# Patient Record
Sex: FEMALE | Race: WHITE | NOT HISPANIC OR LATINO | Employment: UNEMPLOYED | URBAN - NONMETROPOLITAN AREA
[De-identification: names, ages, dates, MRNs, and addresses within clinical notes are randomized per-mention and may not be internally consistent; named-entity substitution may affect disease eponyms.]

---

## 2022-02-21 DIAGNOSIS — I73.9 PVD (PERIPHERAL VASCULAR DISEASE) WITH CLAUDICATION: Primary | ICD-10-CM

## 2022-03-02 ENCOUNTER — OFFICE VISIT (OUTPATIENT)
Dept: CARDIAC SURGERY | Facility: CLINIC | Age: 87
End: 2022-03-02

## 2022-03-02 VITALS
OXYGEN SATURATION: 97 % | DIASTOLIC BLOOD PRESSURE: 81 MMHG | SYSTOLIC BLOOD PRESSURE: 180 MMHG | WEIGHT: 140 LBS | BODY MASS INDEX: 23.9 KG/M2 | HEIGHT: 64 IN | HEART RATE: 82 BPM

## 2022-03-02 DIAGNOSIS — R60.0 LEG EDEMA: ICD-10-CM

## 2022-03-02 DIAGNOSIS — I87.2 VENOUS INSUFFICIENCY OF BOTH LOWER EXTREMITIES: Primary | ICD-10-CM

## 2022-03-02 DIAGNOSIS — R06.00 DYSPNEA, UNSPECIFIED TYPE: ICD-10-CM

## 2022-03-02 PROBLEM — I10 HTN (HYPERTENSION): Status: ACTIVE | Noted: 2022-03-02

## 2022-03-02 PROBLEM — L97.909 VENOUS ULCER: Status: ACTIVE | Noted: 2022-03-02

## 2022-03-02 PROBLEM — M19.90 ARTHRITIS: Status: ACTIVE | Noted: 2022-03-02

## 2022-03-02 PROBLEM — I83.009 VENOUS ULCER: Status: ACTIVE | Noted: 2022-03-02

## 2022-03-02 PROCEDURE — 99204 OFFICE O/P NEW MOD 45 MIN: CPT | Performed by: NURSE PRACTITIONER

## 2022-03-02 RX ORDER — ACETAMINOPHEN AND CODEINE PHOSPHATE 300; 30 MG/1; MG/1
1 TABLET ORAL EVERY 4 HOURS PRN
COMMUNITY

## 2022-03-02 RX ORDER — PREDNISONE 2.5 MG
TABLET ORAL
COMMUNITY
Start: 2022-02-11

## 2022-03-02 RX ORDER — HYDROXYCHLOROQUINE SULFATE 200 MG/1
TABLET, FILM COATED ORAL
COMMUNITY
Start: 2022-02-11

## 2022-03-02 RX ORDER — METHOTREXATE 2.5 MG/1
TABLET ORAL
COMMUNITY
Start: 2022-02-22

## 2022-03-02 RX ORDER — PRAMIPEXOLE DIHYDROCHLORIDE 0.5 MG/1
TABLET ORAL
COMMUNITY

## 2022-03-02 RX ORDER — HYDROCHLOROTHIAZIDE 12.5 MG/1
TABLET ORAL
COMMUNITY

## 2022-03-02 RX ORDER — AMLODIPINE BESYLATE 2.5 MG/1
TABLET ORAL
COMMUNITY

## 2022-03-02 NOTE — PATIENT INSTRUCTIONS
Normal arterial circulation on todays arterial study.     Bilateral venous ultrasound is negative for blood clot or vein reflux.  Pulsatile flow is seen on ultrasound which can sometimes be related to fluid volume overloaded states such as congestive heart failure.  We will obtain echocardiogram to review the contraction of the heart.      Will call with results.     Continue current wound care and compressive therapy.

## 2022-03-02 NOTE — PROGRESS NOTES
CVTS Office Progress Note     3/2/2022    Rosa Duke  2/18/1931    Chief Complaint:    Chief Complaint   Patient presents with   • Leg Swelling   • Chronic Venous Insufficiency w/ Ulceration       HPI:      PCP:  Jhoana Navarrete MD    89 y.o. female with HTN(stable, increased risk stroke, rupture) and Obesity(uncontrolled, increased risk cardiovascular events) leg edema x2 years, venous ulceration Right leg seeing wound care with compressive wraps/polymem.  never smoked.  Slow healing venous ulceration, arterial ultrasound in [facility] suggestive of inflow disease.  Referred to vascular surgery for arterial and venous studies.  Does complain of dyspnea with minimal exertion.  No other associated signs, symptoms or modifying factors.    3/2022 RICK: Right 1.2 triphasic.  Left 1.13 triphasic  3/2022 Bilateral venous: Pulsatile flow BLE, negative for DVT or reflux.     The following portions of the patient's history were reviewed and updated as appropriate: allergies, current medications, past family history, past medical history, past social history, past surgical history and problem list.  Recent images independently reviewed.  Available laboratory values reviewed.    PMH:  Past Medical History:   Diagnosis Date   • Arthritis    • Cancer (HCC)     skin   • Leg ulcer (HCC)     right leg     Past Surgical History:   Procedure Laterality Date   • APPENDECTOMY     • HYSTERECTOMY       Family History   Problem Relation Age of Onset   • No Known Problems Mother    • No Known Problems Father      Social History     Tobacco Use   • Smoking status: Never Smoker   • Smokeless tobacco: Never Used   Substance Use Topics   • Alcohol use: Never   • Drug use: Never       ALLERGIES:  Allergies   Allergen Reactions   • Sulfa Antibiotics Dizziness         MEDICATIONS:    Current Outpatient Medications:   •  acetaminophen-codeine (TYLENOL #3) 300-30 MG per tablet, Take 1 tablet by mouth Every 4 (Four) Hours As Needed for  Moderate Pain ., Disp: , Rfl:   •  amLODIPine (NORVASC) 2.5 MG tablet, amlodipine 2.5 mg tablet  Take 1 tablet every day by oral route., Disp: , Rfl:   •  hydroCHLOROthiazide (HYDRODIURIL) 12.5 MG tablet, hydrochlorothiazide 12.5 mg tablet  2 tablets on M, W, F and 1 tablet rest of week, Disp: , Rfl:   •  hydroxychloroquine (PLAQUENIL) 200 MG tablet, , Disp: , Rfl:   •  methotrexate 2.5 MG tablet, TAKE 4 TABLETS BY MOUTH EVERY 7 DAYS, Disp: , Rfl:   •  metoprolol tartrate (LOPRESSOR) 25 MG tablet, Take 25 mg by mouth 2 (Two) Times a Day., Disp: , Rfl:   •  pramipexole (MIRAPEX) 0.5 MG tablet, pramipexole 0.5 mg tablet  1 tablet PO at HS, Disp: , Rfl:   •  predniSONE (DELTASONE) 2.5 MG tablet, , Disp: , Rfl:       Review of Systems   Constitutional: Negative for chills, decreased appetite, fever and weight loss.   HENT: Negative for congestion, nosebleeds and sore throat.    Eyes: Negative for blurred vision, visual disturbance and visual halos.   Cardiovascular: Positive for dyspnea on exertion and leg swelling. Negative for chest pain.   Respiratory: Positive for shortness of breath. Negative for cough, sputum production and wheezing.    Endocrine: Negative for cold intolerance and polyuria.   Hematologic/Lymphatic: Negative for bleeding problem. Does not bruise/bleed easily.   Skin: Positive for poor wound healing and unusual hair distribution. Negative for flushing and nail changes.   Musculoskeletal: Positive for arthritis, back pain and joint pain.   Gastrointestinal: Negative for bloating, abdominal pain, hematemesis, melena, nausea and vomiting.   Genitourinary: Negative for flank pain and hematuria.   Neurological: Negative for brief paralysis, difficulty with concentration, focal weakness, light-headedness, loss of balance, numbness, paresthesias and weakness.   Psychiatric/Behavioral: Negative for altered mental status, depression, substance abuse and suicidal ideas.   Allergic/Immunologic: Negative for  "hives and persistent infections.         Vitals:    03/02/22 1000   BP: 180/81   BP Location: Left arm   Patient Position: Sitting   Cuff Size: Adult   Pulse: 82   SpO2: 97%   Weight: 63.5 kg (140 lb)   Height: 162.6 cm (64\")     Physical Exam  Vitals and nursing note reviewed.   Constitutional:       Appearance: Normal appearance.   HENT:      Head: Normocephalic.      Nose: Nose normal.      Mouth/Throat:      Mouth: Mucous membranes are moist.   Eyes:      Pupils: Pupils are equal, round, and reactive to light.   Cardiovascular:      Rate and Rhythm: Normal rate.      Pulses: Normal pulses.   Pulmonary:      Effort: Pulmonary effort is normal.      Breath sounds: Normal breath sounds.   Abdominal:      General: Abdomen is flat.      Palpations: Abdomen is soft.   Musculoskeletal:         General: Normal range of motion.      Cervical back: Normal range of motion.      Right lower leg: Edema present.      Left lower leg: Edema present.   Skin:     General: Skin is warm and dry.      Capillary Refill: Capillary refill takes 2 to 3 seconds.      Comments: Bilateral venous staining, dilated veins in feet.     Ulceration anterior calf    Neurological:      General: No focal deficit present.      Mental Status: She is alert.   Psychiatric:         Mood and Affect: Mood normal.         Assessment & Plan     Independent Review of Studies  3/2022 RICK: Right 1.2 triphasic.  Left 1.13 triphasic  3/2022 Bilateral venous: Pulsatile flow BLE, negative for DVT or reflux.     1. Venous insufficiency of both lower extremities  With ulceration.  Recommend continued compression of BLE with wraps, polymem as directed per wound care.     Bilateral venous negative for obstruction or reflux, arterial studies are normal.     Would recommend discontinuation of amlodipine as this can exacerbate edema    Significant pulsatile flow of lower extremities suggestive of hypervolemic state, will obtain echo to rule out CHF    2. Leg " edema  Likely the etiology of her ulceration.  Skin thin from chronic steroid use.  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    See above    - Duplex Venous Lower Extremity - Bilateral CAR; Future  - Adult Transthoracic Echo Complete W/ Cont if Necessary Per Protocol; Future    3. Dyspnea, unspecified type    - Adult Transthoracic Echo Complete W/ Cont if Necessary Per Protocol; Future    Detailed discussion regarding risks, benefits, and treatment plan. Images independently reviewed. Patient understands, agrees, and wishes to proceed with plan.       This document has been electronically signed by EMMA Alvarado-BC @  On March 2, 2022 11:04 CST

## 2022-03-04 ENCOUNTER — PATIENT ROUNDING (BHMG ONLY) (OUTPATIENT)
Dept: CARDIAC SURGERY | Facility: CLINIC | Age: 87
End: 2022-03-04

## 2022-03-04 NOTE — PROGRESS NOTES
March 4, 2022    Hello, may I speak with Rosa Duke?    My name is PAMELLA Matthews      I am  with [facility]  [address] [place] [address] [address] [place] [address] [phone].    Before we get started may I verify your date of birth? 2/18/1931    I am calling to officially welcome you to our practice and ask about your recent visit. Is this a good time to talk? Yes.     Tell me about your visit with us. What things went well?  Everything was good. Brody was very nice.        We're always looking for ways to make our patients' experiences even better. Do you have recommendations on ways we may improve?  No.     Overall were you satisfied with your first visit to our practice? Yes.        I appreciate you taking the time to speak with me today. Is there anything else I can do for you? Yes.       Thank you, and have a great day.

## 2022-03-17 DIAGNOSIS — I34.0 MITRAL VALVE INSUFFICIENCY, UNSPECIFIED ETIOLOGY: Primary | ICD-10-CM
